# Patient Record
Sex: MALE | Race: WHITE
[De-identification: names, ages, dates, MRNs, and addresses within clinical notes are randomized per-mention and may not be internally consistent; named-entity substitution may affect disease eponyms.]

---

## 2017-02-17 NOTE — PD
Physical Exam


Date Seen by Provider:  Feb 17, 2017





Data


Data


Last Documented VS





Vital Signs








  Date Time  Temp Pulse Resp B/P Pulse Ox O2 Delivery O2 Flow Rate FiO2


 


2/17/17 16:24 98.1 90 18 127/90 96 Room Air  








Orders





 Electrocardiogram (2/17/17 13:26)


Prothrombin Time / Inr (Pt) (2/17/17 13:26)


Act Partial Throm Time (Ptt) (2/17/17 13:26)


Complete Blood Count With Diff (2/17/17 13:26)


Comprehensive Metabolic Panel (2/17/17 13:26)


Creatine Kinase (Cpk) (2/17/17 13:26)


Drug Screen, Random Urine (2/17/17 13:26)


Troponin I (2/17/17 13:26)


Urinalysis - C+S If Indicated (2/17/17 13:26)


Ct Brain W/O Iv Contrast(Rout) (2/17/17 13:26)


Chest, Single Ap (2/17/17 13:26)


Ct Cerv Spine W/O Contrast (2/17/17 )


Lipase (2/17/17 13:31)





Labs





 Laboratory Tests








Test 2/17/17 2/17/17





 13:34 13:38


 


White Blood Count 7.0 TH/MM3 


 


Red Blood Count 4.27 MIL/MM3 


 


Hemoglobin 14.1 GM/DL 


 


Hematocrit 40.5 % 


 


Mean Corpuscular Volume 94.9 FL 


 


Mean Corpuscular Hemoglobin 32.9 PG 


 


Mean Corpuscular Hemoglobin 34.7 % 





Concent  


 


Red Cell Distribution Width 13.1 % 


 


Platelet Count 195 TH/MM3 


 


Mean Platelet Volume 9.1 FL 


 


Neutrophils (%) (Auto) 57.3 % 


 


Lymphocytes (%) (Auto) 33.1 % 


 


Monocytes (%) (Auto) 7.0 % 


 


Eosinophils (%) (Auto) 2.4 % 


 


Basophils (%) (Auto) 0.2 % 


 


Neutrophils # (Auto) 4.0 TH/MM3 


 


Lymphocytes # (Auto) 2.3 TH/MM3 


 


Monocytes # (Auto) 0.5 TH/MM3 


 


Eosinophils # (Auto) 0.2 TH/MM3 


 


Basophils # (Auto) 0.0 TH/MM3 


 


CBC Comment DIFF FINAL  


 


Differential Comment   


 


Prothrombin Time 10.6 SEC 


 


Prothromb Time International 1.0 RATIO 





Ratio  


 


Activated Partial 25.3 SEC 





Thromboplast Time  


 


Sodium Level 141 MEQ/L 


 


Potassium Level 3.4 MEQ/L 


 


Chloride Level 106 MEQ/L 


 


Carbon Dioxide Level 25.5 MEQ/L 


 


Anion Gap 10 MEQ/L 


 


Blood Urea Nitrogen 11 MG/DL 


 


Creatinine 1.33 MG/DL 


 


Estimat Glomerular Filtration 58 ML/MIN 





Rate  


 


Random Glucose 102 MG/DL 


 


Calcium Level 9.2 MG/DL 


 


Total Bilirubin 0.3 MG/DL 


 


Aspartate Amino Transf 16 U/L 





(AST/SGOT)  


 


Alanine Aminotransferase 26 U/L 





(ALT/SGPT)  


 


Alkaline Phosphatase 97 U/L 


 


Total Creatine Kinase 189 U/L 


 


Troponin I LESS THAN 0.02 





 NG/ML 


 


Total Protein 7.0 GM/DL 


 


Albumin 4.0 GM/DL 


 


Lipase 127 U/L 


 


Urine Color  LIGHT-YELLOW 


 


Urine Turbidity  CLEAR 


 


Urine pH  6.5 


 


Urine Specific Gravity  1.004 


 


Urine Protein  NEG mg/dL


 


Urine Glucose (UA)  NEG mg/dL


 


Urine Ketones  NEG mg/dL


 


Urine Occult Blood  NEG 


 


Urine Nitrite  NEG 


 


Urine Bilirubin  NEG 


 


Urine Urobilinogen  LESS THAN 2.0





  MG/DL


 


Urine Leukocyte Esterase  NEG 


 


Urine WBC  LESS THAN 1





  /hpf


 


Microscopic Urinalysis Comment  CATH-CULT NOT





  IND


 


Urine Opiates Screen  NEG 


 


Urine Barbiturates Screen  NEG 


 


Urine Amphetamines Screen  NEG 


 


Urine Benzodiazepines Screen  NEG 


 


Urine Cocaine Screen  NEG 


 


Urine Cannabinoids Screen  NEG 











MDM


Medical Record Reviewed:  Yes


Supervised Visit with WILFRED:  Yes


Narrative Course


I, Dr. Love, have reviewed the advance practice practitioner's documentation 

and am in agreement, met with the patient face to face, made the diagnosis, and 

the medical decision making was done by me.  





*My assessment and Findings: 


Parasthesias vs neuropathy, no acute neurologic findings.  Patient will follow 

up neurologist as outpt and return to ER as needed





Patient is a 46-year-old male with complaints of intermittent left leg tingling 

as well as right arm tingling which resolves after he "shakes it off."  Reports 

that symptoms last for about a few seconds at a time and resolves after he 

"shakes it off."  Patient with complete resolutions of symptoms at this time.  

Patient reports that he just wanted to come to the emergency room and be 

evaluated.  Patient reports that the symptoms have been intermittent for the 

past week.  Patient with no headaches or dizziness, no weakness at this time.  

Patient with normal neurological exam. CN 2-12 grossly intact with no neuro 

deficits.  





I reviewed all the patient's labs and studies with patient in detail.  Patient 

with no acute findings. NO cranial nerve deficits and NIH scale 0.  Patient 

reports that he feels 100% better at this time.  Patient will follow-up with 

his neurologist Dr. Massey as outpatient.  Signs and symptoms of when to return 

to the emergency room was reviewed with patient in detail.  I did offer patient 

observation - pt request to be discharged to home and return to ER as needed


Diagnosis





 Primary Impression:  


 Left leg paresthesias


 Additional Impressions:  


 Arm paresthesia, right


 Radicular neuropathy


Referrals:  


Josh Massey PhD MD


Patient Instructions:  General Instructions





***Additional Instruction:


Please follow-up with neurologist as soon as possible





Return to emergency room as needed or if symptoms return


Disposition:  01 DISCHARGE HOME


Condition:  Stable








Stefani Love DO Feb 17, 2017 16:56

## 2017-02-17 NOTE — PD
HPI


Chief Complaint:  Musculoskeletal Complaint


Time Seen by Provider:  13:30


Travel History


International Travel<30 days:  No


Contact w/Intl Traveler<30days:  No


Traveled to known affect area:  No





History of Present Illness


HPI


Patient is a 46-year-old male with a history of cerebral white matter disease 

not multiple sclerosis but is not currently followed by a neurologist 

presenting with intermittent neurologic symptoms for 2 days.  He states that 2 

days ago his left leg went tingly and numb for several minutes.  He states that 

he was walking when this happened.  He was able to continue walking and had no 

weakness.  He was able to "shake the leg "and improved.  Yesterday his right 

arm to the same thing but this was over the C5-C6 distribution.  There was no 

provocative factor and again was alleviated by him shaking the limb, lasted for 

several minutes.  He had minor discomfort when this occurred as well.  This 

morning he had an episode where he felt unsteady, like I was on a boat "for 1-2 

hours.  This did seem to start with sitting up but it was not alleviated by 

lying down or once he was up and walking.  He had some mild nausea without 

vomiting.  He denies syncope.  He denies any focal abnormality.  He reports he'

s had torticollis in the neck and per his history he has had dyskinesia 

secondary to psychiatric medications in the past.  He denies any current 

dystonia or dyskinesia.  States his neck is always chronically turned towards 

the right.  Without acute exacerbation.  He denies any difficulty breathing or 

vision changes.  He denies alcohol tobacco and illicit drug use.





PFSH


Past Medical History


Depression:  Yes


Cardiovascular Problems:  No


Diabetes:  No


Endocrine:  No


Gastrointestinal Disorders:  No


Genitourinary:  No


Hiatal Hernia:  No


Hypertension:  No


Immune Disorder:  No


Musculoskeletal:  Yes (DEMYLENATING DISEASE POSSIBLY. MRI SHOWED 

PROBLEM.NEUROLOGIST STATED NO)


Neurologic:  Yes (numbness tingling back, head, feet)


Reproductive:  No


Respiratory:  No


Schizophrenia:  Yes


Thyroid Disease:  No





Past Surgical History


AICD:  No


Joint Replacement:  No


Pacemaker:  No





Social History


Alcohol Use:  No


Tobacco Use:  No


Substance Use:  No





Allergies-Medications


(Allergen,Severity, Reaction):  


Coded Allergies:  


     Tetracycline (Verified  Allergy, Severe, unknown, 2/17/17)


Uncoded Allergies:  


     REQUESTS NO NARCOTICS (Allergy, Mild, 3/14/09)


Reported Meds & Prescriptions





Reported Meds & Active Scripts


Active


Lovastatin 20 Mg Tab 20 Mg PO HS


Ibuprofen 800 Mg Tab 800 Mg PO Q8H PRN


Methocarbamol 750 Mg Tab 750 Mg PO BID


Reported


Triamcinolone Acetonide (Triamcinolone Acetonide (Topic) 1 Pow Pow 0.1 % 

TOPICAL BID


Cymbalta DR (Duloxetine HCl) 60 Mg Capdr 60 Mg PO HS


Bupropion Sr 12 HR (Bupropion ER 12 HR (Smoking Deterrent)) 150 Mg Tab 150 Mg 

PO BID


     Take 1 tablet daily x 3 days then twice daily thereafter.


Gabapentin 600 Mg Tab 600 Mg PO TID


Buspirone (Buspirone HCl) 10 Mg Tab 10 Mg PO TID


Latuda (Lurasidone) 40 Mg Tab 40 Mg PO HS


Trihexyphenidyl (Trihexyphenidyl HCl) 5 Mg Tab 5 Mg PO TID








Review of Systems


Except as stated in HPI:  all other systems reviewed are Neg





Physical Exam


Narrative


GENERAL: Well-developed and well-nourished adult male in no acute distress.


SKIN: Warm and dry.  Good turgor without tenting.


HEAD: Normocephalic and atraumatic.


EYES: PERRL bilaterally, 5mm. EOMI bilaterally. No injection or icterus 

present. No proptosis. Lids without edema or erythema.  Head impulse test shows 

normal eye movement.  No nystagmus with EOMs.  No skew with covering uncovering 

of the eyes bilaterally.


ENT: Negative bilateral Rochester-Hallpike maneuver.  Bilateral ear canals are non-

edematous/non-erythematous without otorrhea. Bilateral TMs have intact 

landmarks and without distortion, perforation, air-fluid level or erythema. 

Nasal mucosa pink and moist without discharge, septum intact and midline. 

Buccal mucosa pink and moist. Oropharynx free of erythema, tonsillar hypertrophy

, masses, swelling, asymmetry and exudates. Uvula midline and airway patent.


NECK: Supple, no meningeal signs.  Patient does have head actually rotated 

towards the right slightly which is chronic.  No tenderness to palpation of the 

neck.  Redundant neck tissue without any clear masses palpated.  No induration. 

Trachea midline, no JVD. No cervical or facial lymphadenopathy.


CARDIOVASCULAR: Regular rate and rhythm without murmurs, rubs, clicks or 

gallops. Radial and posterior tibial pulses 2+ bilaterally. No pedal edema.


RESPIRATORY: Clear to auscultation bilaterally with symmetrical rise and fall, 

no distress or use of accessory muscles.


GASTROINTESTINAL: Non-tender, non-distended. Normal bowel sounds all 4 

quadrants. No masses or organomegaly present. 


MUSCULOSKELETAL: Negative Spurling, Wrights and Adsons test.  No gait 

disturbance.  Patient freely moving all four extremities spontaneously. 

Extremities without clubbing, cyanosis, or edema. No obvious deformities. 


NEUROLOGIC:  CN II-XII grossly intact. Awake and alert.  Negative Romberg and 

pronator drift.  Parisi and accurate finger to nose testing bilaterally.  No 

dysdiadochokinesis.  Strength 5/5 bilateral shoulder flexion, shoulder extension

, shoulder abduction, shoulder adduction, elbow flexion, elbow extension.  

Sensation intact and strength 5/5 over radial, median, and ulnar nerve 

distributions bilaterally.Sensation intact L2-S2 bilaterally. Strength 5/5 in 

hip flexion, hip extension, knee flexion, knee extension, plantar flexion, 

dorsiflexion bilaterally. Bilateral triceps, biceps, brachioradialis DTRs 1+, 

bilateral patellar and Achilles DTRs 2+.  Negative bilateral Gabbi sign.  

Downgoing Babinskis bilaterally. Normal speech.


PSYCHIATRIC: Appropriate mood and affect; insight and judgment normal.





Data


Data


Last Documented VS





Vital Signs








  Date Time  Temp Pulse Resp B/P Pulse Ox O2 Delivery O2 Flow Rate FiO2


 


2/17/17 16:24 98.1 90 18 127/90 96 Room Air  








Orders





 Electrocardiogram (2/17/17 13:26)


Prothrombin Time / Inr (Pt) (2/17/17 13:26)


Act Partial Throm Time (Ptt) (2/17/17 13:26)


Complete Blood Count With Diff (2/17/17 13:26)


Comprehensive Metabolic Panel (2/17/17 13:26)


Creatine Kinase (Cpk) (2/17/17 13:26)


Drug Screen, Random Urine (2/17/17 13:26)


Troponin I (2/17/17 13:26)


Urinalysis - C+S If Indicated (2/17/17 13:26)


Ct Brain W/O Iv Contrast(Rout) (2/17/17 13:26)


Chest, Single Ap (2/17/17 13:26)


Ct Cerv Spine W/O Contrast (2/17/17 )


Lipase (2/17/17 13:31)





Labs








 Laboratory Tests








Test 2/17/17 2/17/17





 13:34 13:38


 


White Blood Count 7.0 TH/MM3 


 


Red Blood Count 4.27 MIL/MM3 


 


Hemoglobin 14.1 GM/DL 


 


Hematocrit 40.5 % 


 


Mean Corpuscular Volume 94.9 FL 


 


Mean Corpuscular Hemoglobin 32.9 PG 


 


Mean Corpuscular Hemoglobin 34.7 % 





Concent  


 


Red Cell Distribution Width 13.1 % 


 


Platelet Count 195 TH/MM3 


 


Mean Platelet Volume 9.1 FL 


 


Neutrophils (%) (Auto) 57.3 % 


 


Lymphocytes (%) (Auto) 33.1 % 


 


Monocytes (%) (Auto) 7.0 % 


 


Eosinophils (%) (Auto) 2.4 % 


 


Basophils (%) (Auto) 0.2 % 


 


Neutrophils # (Auto) 4.0 TH/MM3 


 


Lymphocytes # (Auto) 2.3 TH/MM3 


 


Monocytes # (Auto) 0.5 TH/MM3 


 


Eosinophils # (Auto) 0.2 TH/MM3 


 


Basophils # (Auto) 0.0 TH/MM3 


 


CBC Comment DIFF FINAL  


 


Differential Comment   


 


Prothrombin Time 10.6 SEC 


 


Prothromb Time International 1.0 RATIO 





Ratio  


 


Activated Partial 25.3 SEC 





Thromboplast Time  


 


Sodium Level 141 MEQ/L 


 


Potassium Level 3.4 MEQ/L 


 


Chloride Level 106 MEQ/L 


 


Carbon Dioxide Level 25.5 MEQ/L 


 


Anion Gap 10 MEQ/L 


 


Blood Urea Nitrogen 11 MG/DL 


 


Creatinine 1.33 MG/DL 


 


Estimat Glomerular Filtration 58 ML/MIN 





Rate  


 


Random Glucose 102 MG/DL 


 


Calcium Level 9.2 MG/DL 


 


Total Bilirubin 0.3 MG/DL 


 


Aspartate Amino Transf 16 U/L 





(AST/SGOT)  


 


Alanine Aminotransferase 26 U/L 





(ALT/SGPT)  


 


Alkaline Phosphatase 97 U/L 


 


Total Creatine Kinase 189 U/L 


 


Troponin I LESS THAN 0.02 





 NG/ML 


 


Total Protein 7.0 GM/DL 


 


Albumin 4.0 GM/DL 


 


Lipase 127 U/L 


 


Urine Color  LIGHT-YELLOW 


 


Urine Turbidity  CLEAR 


 


Urine pH  6.5 


 


Urine Specific Gravity  1.004 


 


Urine Protein  NEG mg/dL


 


Urine Glucose (UA)  NEG mg/dL


 


Urine Ketones  NEG mg/dL


 


Urine Occult Blood  NEG 


 


Urine Nitrite  NEG 


 


Urine Bilirubin  NEG 


 


Urine Urobilinogen  LESS THAN 2.0





  MG/DL


 


Urine Leukocyte Esterase  NEG 


 


Urine WBC  LESS THAN 1





  /hpf


 


Microscopic Urinalysis Comment  CATH-CULT NOT





  IND


 


Urine Opiates Screen  NEG 


 


Urine Barbiturates Screen  NEG 


 


Urine Amphetamines Screen  NEG 


 


Urine Benzodiazepines Screen  NEG 


 


Urine Cocaine Screen  NEG 


 


Urine Cannabinoids Screen  NEG 














MDM


Medical Decision Making


Medical Screen Exam Complete:  Yes


Emergency Medical Condition:  Yes


Differential Diagnosis


CVA versus TIA versus paresthesia versus neurapraxia versus white matter 

disease versus MS versus HNP versus thoracic outlet syndrome


Narrative Course


Workup initiated in triage.  Once a medical bed becomes available patient will 

be transferred and care assumed by the next provider.





Patient is a 46-year-old male with history of unspecific cerebral white matter 

disease but MS was excluded who is not had any problems for approximately 3-4 

years, no current neurologist.  Last 2 days he has had some paresthesia in the 

left lower extremity and the right upper extremity and had a 1-2 hours of 

unsteadiness earlier today.  All symptoms have resolved.  He has history of 

torticollis and has chronic axial rotation of the neck towards the right.  

Thoracic outlet syndrome tests not suggestive of thoracic outlet syndrome.  He 

is neurovascularly intact.  He has no vision or respiratory complaints.  Mild 

tachycardic in triage however on my exam heart rate is 88 and regular. Ordered 

CT of the head and C-spine.  Ordered chest x-ray, EKG, troponin, CPK and 

additional labs to initiate workup.





Patient was signed out to Dr. Love was also to evaluate the patient and order 

additional testing and disposition as necessary.





Diagnosis





 Primary Impression:  


 Left leg paresthesias


 Additional Impressions:  


 Radicular neuropathy


 Arm paresthesia, right


Condition:  Stable








Aydin Godfrey III Feb 17, 2017 13:31

## 2017-02-17 NOTE — RADRPT
EXAM DATE/TIME:  02/17/2017 14:48 

 

[facility] COMPARISON:     

No previous studies available for comparison.

 

 

INDICATIONS :     

Left leg numbness and tingling and right arm numbness. 

                      

 

RADIATION DOSE:     

39.14 CTDIvol (mGy) 

 

 

 

MEDICAL HISTORY :     

None  

 

SURGICAL HISTORY :      

None. 

 

ENCOUNTER:      

Initial

 

ACUITY:      

1 week

 

PAIN SCALE:      

0/10

 

LOCATION:        

neck 

 

TECHNIQUE:     

Volumetric scanning of the cervical spine was performed. Multiplanar reconstructions in the sagittal,
 coronal and oblique axial planes were performed.   Using automated exposure control and adjustment o
f the mA and/or kV according to patient size, radiation dose was kept as low as reasonably achievable
 to obtain optimal diagnostic quality images. 

 

FINDINGS:     

 

VERTEBRAE:     

Normal vertebral body height.

 

ALIGNMENT:     

No evidence of subluxation.

 

C2-C3:  

The bony spinal canal is normal in size.  No evidence of disc bulge or herniation.  The neural forami
na are bilaterally patent.

 

C3-C4:  

The bony spinal canal is normal in size.  No evidence of disc bulge or herniation.  The neural forami
na are bilaterally patent.

 

C4-C5:  

The bony spinal canal is normal in size.  No evidence of disc bulge or herniation.  The neural forami
na are bilaterally patent.

 

C5-C6:  

The bony spinal canal is normal in size.  No evidence of disc bulge or herniation.  The neural forami
na are bilaterally patent.

 

C6-C7:  

The bony spinal canal is normal in size.  No evidence of disc bulge or herniation.  The neural forami
na are bilaterally patent.

 

C7-T1:  

The bony spinal canal is normal in size.  No evidence of disc bulge or herniation.  The neural forami
na are bilaterally patent.

 

CONCLUSION:     

Unremarkable exam.

 

 

 

 Jamie Ken MD on February 17, 2017 at 15:21           

Board Certified Radiologist.

 This report was verified electronically.

## 2017-02-17 NOTE — RADRPT
EXAM DATE/TIME:  02/17/2017 14:09 

 

[facility] COMPARISON:     

CLAVICLE LEFT, June 02, 2016, 9:53.

 

                     

INDICATIONS :     

Shortness of breath and right arm numbness.

                     

 

MEDICAL HISTORY :            

Multiple sclerosis. Hypercholesterolemia. Neuropathy. Hyperlipidemia. Demylenating disease. Schizophr
enia.   

 

SURGICAL HISTORY :     

None.   

 

ENCOUNTER:     

Initial                                        

 

ACUITY:     

1 day      

 

PAIN SCORE:     

0/10

 

LOCATION:     

Bilateral chest 

 

FINDINGS:     

A single view of the chest demonstrates the lungs to be symmetrically aerated without evidence of mas
s, infiltrate or effusion.  The cardiomediastinal contours are unremarkable.  Osseous structures are 
intact.

 

CONCLUSION:     

1. No acute cardiopulmonary findings.

 

 

 

 Tyron Kim MD on February 17, 2017 at 14:29           

Board Certified Radiologist.

 This report was verified electronically.

## 2017-02-17 NOTE — RADRPT
EXAM DATE/TIME:  02/17/2017 14:48 

 

[facility] COMPARISON:     

CT BRAIN W & W/O CONTRAST, May 19, 2016, 11:25.

 

 

INDICATIONS :     

Left leg numbness and tingling and right arm numbness. 

                      

 

RADIATION DOSE:     

69.15 CTDIvol (mGy) 

 

 

 

MEDICAL HISTORY :     

None  

 

SURGICAL HISTORY :      

None. 

 

ENCOUNTER:      

Initial

 

ACUITY:      

1 week

 

PAIN SCALE:      

0/10

 

LOCATION:        

cranial 

 

TECHNIQUE:     

Multiple contiguous axial images were obtained of the head.  Using automated exposure control and adj
ustment of the mA and/or kV according to patient size, radiation dose was kept as low as reasonably a
chievable to obtain optimal diagnostic quality images. 

 

FINDINGS:     

 

CEREBRUM:     

The ventricles are normal for age.  Patchy periventricular white matter lucencies are present. No raquel
dence of midline shift, mass lesion, hemorrhage or acute infarction.  No extra-axial fluid collection
s are seen.

 

POSTERIOR FOSSA:     

The cerebellum and brainstem are intact.  The 4th ventricle is midline.  The cerebellopontine angle i
s unremarkable.

 

EXTRACRANIAL:     

The visualized portion of the orbits is intact.

 

SKULL:     

The calvaria is intact.  No evidence of skull fracture.

 

CONCLUSION:     Patchy periventricular white matter lucencies are again noted with chronic small vess
el ischemic change. There is no acute hemorrhage or mass effect. 

 

 

 Jamie Ken MD on February 17, 2017 at 15:04           

Board Certified Radiologist.

 This report was verified electronically.

## 2017-02-18 NOTE — EKG
Date Performed: 02/17/2017       Time Performed: 13:59:10

 

PTAGE:      46 years

 

EKG:      Sinus rhythm 

 

 Compared to previous tracing, ST-T changes have improved, and the HR is slower NORMAL ECG

 

PREVIOUS TRACING       : 10/28/2014 14.00

 

DOCTOR:   Estuardo Davis  Interpretating Date/Time  02/18/2017 15:38:24

## 2017-10-02 ENCOUNTER — HOSPITAL ENCOUNTER (EMERGENCY)
Dept: HOSPITAL 17 - NED | Age: 47
Discharge: LEFT BEFORE DISCHARGE | End: 2017-10-02
Payer: COMMERCIAL

## 2017-10-02 VITALS
OXYGEN SATURATION: 95 % | HEART RATE: 104 BPM | RESPIRATION RATE: 15 BRPM | TEMPERATURE: 98.5 F | SYSTOLIC BLOOD PRESSURE: 120 MMHG | DIASTOLIC BLOOD PRESSURE: 86 MMHG

## 2017-10-02 DIAGNOSIS — R10.9: ICD-10-CM

## 2017-10-02 DIAGNOSIS — R61: Primary | ICD-10-CM

## 2017-10-02 DIAGNOSIS — F31.9: ICD-10-CM

## 2017-10-02 DIAGNOSIS — R19.7: ICD-10-CM

## 2017-10-02 LAB
ANION GAP SERPL CALC-SCNC: 7 MEQ/L (ref 5–15)
BASOPHILS # BLD AUTO: 0 TH/MM3 (ref 0–0.2)
BASOPHILS NFR BLD: 0.5 % (ref 0–2)
BUN SERPL-MCNC: 18 MG/DL (ref 7–18)
CHLORIDE SERPL-SCNC: 105 MEQ/L (ref 98–107)
EOSINOPHIL # BLD: 0 TH/MM3 (ref 0–0.4)
EOSINOPHIL NFR BLD: 0.4 % (ref 0–4)
ERYTHROCYTE [DISTWIDTH] IN BLOOD BY AUTOMATED COUNT: 13.4 % (ref 11.6–17.2)
ETHANOL SERPL-MCNC: (no result) MG/DL (ref 0–5)
GFR SERPLBLD BASED ON 1.73 SQ M-ARVRAT: 62 ML/MIN (ref 89–?)
HCO3 BLD-SCNC: 26.3 MEQ/L (ref 21–32)
HCT VFR BLD CALC: 50.6 % (ref 39–51)
HEMO FLAGS: (no result)
LYMPHOCYTES # BLD AUTO: 2.3 TH/MM3 (ref 1–4.8)
LYMPHOCYTES NFR BLD AUTO: 27 % (ref 9–44)
MCH RBC QN AUTO: 33.7 PG (ref 27–34)
MCHC RBC AUTO-ENTMCNC: 35 % (ref 32–36)
MCV RBC AUTO: 96.1 FL (ref 80–100)
MONOCYTES NFR BLD: 6.2 % (ref 0–8)
NEUTROPHILS # BLD AUTO: 5.6 TH/MM3 (ref 1.8–7.7)
NEUTROPHILS NFR BLD AUTO: 65.9 % (ref 16–70)
PLATELET # BLD: 245 TH/MM3 (ref 150–450)
POTASSIUM SERPL-SCNC: 4 MEQ/L (ref 3.5–5.1)
RBC # BLD AUTO: 5.26 MIL/MM3 (ref 4.5–5.9)
SCAN/DIFF: (no result)
SODIUM SERPL-SCNC: 138 MEQ/L (ref 136–145)
WBC # BLD AUTO: 8.5 TH/MM3 (ref 4–11)

## 2017-10-02 PROCEDURE — 85025 COMPLETE CBC W/AUTO DIFF WBC: CPT

## 2017-10-02 PROCEDURE — 80048 BASIC METABOLIC PNL TOTAL CA: CPT

## 2017-10-02 PROCEDURE — 99283 EMERGENCY DEPT VISIT LOW MDM: CPT

## 2017-10-02 PROCEDURE — 80307 DRUG TEST PRSMV CHEM ANLYZR: CPT

## 2017-10-02 NOTE — PD
Physical Exam


Time Seen by Provider:  12:00


Narrative


Pt presents to ED for evaluation urinary burning, decreased appetite, cold 

sweats, and diarrhea x 3 days. No abdominal pain. L flank cramping. Pt also 

reports racing thoughts and weird dreams. History of bipolar disorder. Denies SI

/HI Of note pt has been taking large amount of energy drinks, otc erectile 

dysfunction meds, and more than prescribed buproprion during this time frame.  

Here for medical clearance to return to [facility] facility.





Data


Data


Last Documented VS





Vital Signs








  Date Time  Temp Pulse Resp B/P (MAP) Pulse Ox O2 Delivery O2 Flow Rate FiO2


 


10/2/17 11:48 98.5 104 15 120/86 (97) 95   








Orders





 Orders


Complete Blood Count With Diff (10/2/17 12:04)


Basic Metabolic Panel (Bmp) (10/2/17 12:04)


Alcohol (Ethanol) (10/2/17 12:04)





Labs





Laboratory Tests








Test


  10/2/17


12:15


 


White Blood Count 8.5 TH/MM3 


 


Red Blood Count 5.26 MIL/MM3 


 


Hemoglobin 17.7 GM/DL 


 


Hematocrit 50.6 % 


 


Mean Corpuscular Volume 96.1 FL 


 


Mean Corpuscular Hemoglobin 33.7 PG 


 


Mean Corpuscular Hemoglobin


Concent 35.0 % 


 


 


Red Cell Distribution Width 13.4 % 


 


Platelet Count 245 TH/MM3 


 


Mean Platelet Volume 8.7 FL 


 


Neutrophils (%) (Auto) 65.9 % 


 


Lymphocytes (%) (Auto) 27.0 % 


 


Monocytes (%) (Auto) 6.2 % 


 


Eosinophils (%) (Auto) 0.4 % 


 


Basophils (%) (Auto) 0.5 % 


 


Neutrophils # (Auto) 5.6 TH/MM3 


 


Lymphocytes # (Auto) 2.3 TH/MM3 


 


Monocytes # (Auto) 0.5 TH/MM3 


 


Eosinophils # (Auto) 0.0 TH/MM3 


 


Basophils # (Auto) 0.0 TH/MM3 


 


CBC Comment AUTO DIFF 


 


Differential Comment


  AUTO DIFF


CONFIRMED


 


Blood Urea Nitrogen 18 MG/DL 


 


Creatinine 1.24 MG/DL 


 


Random Glucose 89 MG/DL 


 


Calcium Level 9.7 MG/DL 


 


Sodium Level 138 MEQ/L 


 


Potassium Level 4.0 MEQ/L 


 


Chloride Level 105 MEQ/L 


 


Carbon Dioxide Level 26.3 MEQ/L 


 


Anion Gap 7 MEQ/L 


 


Estimat Glomerular Filtration


Rate 62 ML/MIN 


 


 


Ethyl Alcohol Level


  LESS THAN 3


MG/DL











[facility]


Medical Record Reviewed:  Yes


Supervised Visit with WILFRED:  No


Disposition:  07 AGAINST MEDICAL ADVICE


Condition:  Stable











Cynthia Hopkins Oct 2, 2017 12:02

## 2018-04-26 ENCOUNTER — HOSPITAL ENCOUNTER (EMERGENCY)
Dept: HOSPITAL 17 - NEPD | Age: 48
Discharge: LEFT BEFORE DISCHARGE | End: 2018-04-26
Payer: SELF-PAY

## 2018-04-26 VITALS
SYSTOLIC BLOOD PRESSURE: 121 MMHG | OXYGEN SATURATION: 98 % | DIASTOLIC BLOOD PRESSURE: 61 MMHG | TEMPERATURE: 98.1 F | HEART RATE: 104 BPM | RESPIRATION RATE: 18 BRPM

## 2018-04-26 DIAGNOSIS — Z88.8: ICD-10-CM

## 2018-04-26 DIAGNOSIS — R20.0: Primary | ICD-10-CM

## 2018-04-26 DIAGNOSIS — Z79.899: ICD-10-CM

## 2018-04-26 PROCEDURE — 99281 EMR DPT VST MAYX REQ PHY/QHP: CPT

## 2018-04-26 NOTE — PD
HPI


.


Numbness


Chief Complaint:  Musculoskeletal Complaint


Time Seen by Provider:  11:08


Travel History


International Travel<30 days:  No


Contact w/Intl Traveler<30days:  No


Traveled to known affect area:  No





History of Present Illness


HPI


This patient presents to us with the chief complaint of right thigh numbness.  

Onset has been "months."  Symptoms are unrelieved by ice.  No exacerbating 

symptoms.  No associated symptoms.  No associated back pain.  No associated 

fevers.  No associated perineal anesthesia.  No associated bowel or bladder 

incontinence.





History


Social History


Alcohol Use:  No


Tobacco Use:  No





Allergies-Medications


(Allergen,Severity, Reaction):  


Coded Allergies:  


     doxycycline (Unverified  Allergy, Severe, unknown, 8/15/17)


     minocycline (Unverified  Allergy, Severe, unknown, 8/15/17)


     tigecycline (Unverified  Allergy, Severe, unknown, 8/15/17)


Uncoded Allergies:  


     REQUESTS NO NARCOTICS (Allergy, Mild, 3/14/09)


Reported Meds & Prescriptions





Reported Meds & Active Scripts


Active


Gabapentin 800 Mg Tab 800 Mg PO TID


Methocarbamol 750 Mg Tab 750 Mg PO BID


Lovastatin 20 Mg Tab 20 Mg PO HS


Ibuprofen 800 Mg Tab 800 Mg PO Q8H PRN


Reported


Triamcinolone Acetonide (Triamcinolone Acetonide (Topic) 1 Pow Pow 0.1 % 

TOPICAL BID


Cymbalta DR (Duloxetine HCl) 60 Mg Capdr 60 Mg PO HS


Bupropion Sr 12 HR (Bupropion ER 12 HR (Smoking Deterrent)) 150 Mg Tab 150 Mg 

PO BID


     Take 1 tablet daily x 3 days then twice daily thereafter.


Gabapentin 600 Mg Tab 600 Mg PO TID


Buspirone (Buspirone HCl) 10 Mg Tab 15 Mg PO TID


Latuda (Lurasidone) 40 Mg Tab 20 Mg PO HS


Trihexyphenidyl (Trihexyphenidyl HCl) 5 Mg Tab 5 Mg PO TID








Review of Systems


Except as stated in HPI:  all other systems reviewed are Neg





Physical Exam


Narrative


GENERAL: Awake and alert and in no acute distress.


SKIN: Warm and dry.


HEAD: Normocephalic/atraumatic.


EYES: Pupils are equal.  Extraocular movements are intact.


NECK: Normal range of motion.


CARDIOVASCULAR: Regular rate and rhythm.


RESPIRATORY: Nonlabored respirations.


MUSCULOSKELETAL: Atraumatic.  There is no tenderness to percussion of his spine.


NEUROLOGICAL: A and O 3.  Cranial nerves are intact.  He is moving both lower 

extremities equally without any apparent weakness.  His lower extremity 

reflexes are equal but diminished bilaterally.  There is no muscular atrophy.  

He has good capillary refill and pulses.


PSYCHIATRIC: Appropriate mood and affect.





Data


Data


Last Documented VS





Vital Signs








  Date Time  Temp Pulse Resp B/P (MAP) Pulse Ox O2 Delivery O2 Flow Rate FiO2


 


4/26/18 09:16 98.1 104 18 121/61 (81) 98   











MDM


Medical Screen Exam Complete:  Yes


Emergency Medical Condition:  No


Narrative Course


A medical screening exam was performed: 


At the time of evaluation the presenting medical condition was determined not 

to be of an emergent nature. 


The patient was given the option of receiving additional care, but declined. 


Patient was given options for additional community resources from which to 

obtain care.


The Patient Has Been advised to seek medical attention for their presenting 

complaint.


The patient has been advised to return to the ER at any time if an emergent 

condition develops.





 Primary Impression:  


 Encounter for medical screening examination


Condition:  Stable











Tiny Vu MD Apr 26, 2018 11:25